# Patient Record
Sex: MALE | Race: WHITE
[De-identification: names, ages, dates, MRNs, and addresses within clinical notes are randomized per-mention and may not be internally consistent; named-entity substitution may affect disease eponyms.]

---

## 2018-03-01 ENCOUNTER — HOSPITAL ENCOUNTER (INPATIENT)
Dept: HOSPITAL 74 - YASAS | Age: 58
LOS: 5 days | Discharge: HOME | DRG: 897 | End: 2018-03-06
Attending: INTERNAL MEDICINE | Admitting: INTERNAL MEDICINE
Payer: COMMERCIAL

## 2018-03-01 VITALS — BODY MASS INDEX: 24.5 KG/M2

## 2018-03-01 DIAGNOSIS — R73.9: ICD-10-CM

## 2018-03-01 DIAGNOSIS — N17.9: ICD-10-CM

## 2018-03-01 DIAGNOSIS — F11.23: Primary | ICD-10-CM

## 2018-03-01 DIAGNOSIS — D72.829: ICD-10-CM

## 2018-03-01 DIAGNOSIS — K21.9: ICD-10-CM

## 2018-03-01 DIAGNOSIS — J02.9: ICD-10-CM

## 2018-03-01 DIAGNOSIS — F19.282: ICD-10-CM

## 2018-03-01 DIAGNOSIS — F17.213: ICD-10-CM

## 2018-03-01 PROCEDURE — HZ2ZZZZ DETOXIFICATION SERVICES FOR SUBSTANCE ABUSE TREATMENT: ICD-10-PCS | Performed by: INTERNAL MEDICINE

## 2018-03-01 RX ADMIN — ALUMINUM HYDROXIDE, MAGNESIUM HYDROXIDE, AND SIMETHICONE PRN ML: 200; 200; 20 SUSPENSION ORAL at 20:34

## 2018-03-01 RX ADMIN — Medication SCH: at 22:18

## 2018-03-01 RX ADMIN — QUETIAPINE FUMARATE SCH MG: 100 TABLET ORAL at 22:18

## 2018-03-01 RX ADMIN — Medication SCH MG: at 22:17
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
## 2018-03-01 NOTE — HP
COWS





- Scale


Resting Pulse: 0= NE 80 or Below


Sweatin=Flushed/Facial Moisture


Restless Observation: 1= Difficult to Sit Still


Pupil Size: 0= Normal to Room Light


Bone or Joint Aches: 2= Severe Diffuse Aches


Runny Nose/ Eye Tearin= Runny Nose/Eyes


GI Upset > 30mins: 2= Nausea/Diarrhea


Tremor Observation: 2= Slight Tremor Visible


Yawning Observation: 2= >3x During Session


Anxiety or Irritability: 2=Irritable/Anxious


Goose Flesh Skin: 3=Piloerection


COWS Score: 18





Admission ROS S





- HPI


Chief Complaint: 





I need to stop all types of pain medication. I want to get an alternative to 

deal with my pain.  


Allergies/Adverse Reactions: 


 Allergies











Allergy/AdvReac Type Severity Reaction Status Date / Time


 


No Known Allergies Allergy   Verified 18 12:24











History of Present Illness: 





Pt is a 58yr old male with a history of chronic pain and has been abusing 

fentayl and percocets for the last 2 months and he is seeking detox for his 

dependence. pt is willing to find an alternative to deal with his pain. pt 

states he will go back to his pain management doctor (Dr[facility] Sally Dowd [facility] [address] [place] [address] to discuss what he can do without 

needing any more opiods. Pt also will have this discussion with his PMD Dr. Miranda which required this patient to come to detox. Dr. Miranda also states that 

he is the doctor that has been prescribing this patient oxycodone. 


Exam Limitations: No Limitations





- Ebola screening


Have you traveled outside of the country in the last 21 days: No


Have you had contact with anyone from an Ebola affected area: No


Have you been sick,other than usual withdrawal symptoms: No


Do you have a fever: No





- Review of Systems


Constitutional: Chills, Diaphoresis, Loss of Appetite, Night Sweats, Changes in 

sleep, Unintentional Wgt. Loss


EENT: reports: No Symptoms Reported, Tearing, Nose Congestion


Respiratory: reports: No Symptoms reported


Cardiac: reports: Syncope


GI: reports: Diarrhea, Nausea, Poor Appetite, Poor Fluid Intake, Vomiting, 

Indigestion, Abdominal cramping


: reports: No Symptoms Reported


Musculoskeletal: reports: Back Pain, Joint Pain, Muscle Pain


Integumentary: reports: Flushing, Sweating


Neuro: reports: Tingling, Tremors


Endocrine: reports: Excessive Sweating, Flushing, Intolerance to Cold, 

Intolerance to Heat


Hematology: reports: No Symptoms Reported


Psychiatric: reports: Judgement Intact, Mood/Affect Appropiate, Orientated x3, 

Agitated, Anxious, Depressed


Other Systems: Reviewed and Negative





Patient History





- Patient Medical History


Hx Anemia: No


Hx Asthma: No


Hx Chronic Obstructive Pulmonary Disease (COPD): No


Hx Cancer: No


Hx Cardiac Disorders: No


Hx Congestive Heart Failure: No


Hx Hypertension: No


Hx Hypercholesterolemia: Yes (for only one month)


Hx Pacemaker: No


HX Cerebrovascular Accident: No


Hx Seizures: No


Hx Dementia: No


Hx Diabetes: No


Hx Gastrointestinal Disorders: No


Hx Liver Disease: No


Hx Genitourinary Disorders: No


Hx Sexually Transmitted Disorders: No


Hx Renal Disease (ESRD): No


Hx Thyroid Disease: No


Hx Human Immunodeficiency Virus (HIV): No (negative)


Hx Hepatitis C: No (negative)


Hx Depression: Yes


Hx Suicide Attempt: No (denies)


Hx Bipolar Disorder: No


Hx Schizophrenia: No





- Patient Surgical History


Past Surgical History: Yes


Hx Neurologic Surgery: Yes (fusion, back)


Hx Appendectomy: Yes





- PPD History


Previous Implant?: Yes


Documented Results: Negative w/o proof


Implanted On Prior [facility] Admission?: No


PPD to be Administered?: Yes





- Reproductive History


Patient is a Female of Child Bearing Age (11 -55 yrs old): No





- Smoking Cessation


Smoking history: Current every day smoker


Aproximately how many cigarettes per day: 6


Hx Chewing Tobacco Use: No


Initiated information on smoking cessation: Yes


'Breaking Loose' booklet given: 18





- Substance & Tx. History


Hx Alcohol Use: No


Hx Substance Use: Yes


Substance Use Type: Opiates


Hx Substance Use Treatment: No





- Substances Abused


  ** Percocet


Route: Oral


Frequency: 3-6 times per week


Amount used: 2 tabs. ( mg.)


Age of first use: 55


Date of Last Use: 18





Family Disease History





- Family Disease History


Family History: Denies





Admission Physical Exam [facility]





- Vital Signs


Vital Signs: 


 Vital Signs - 24 hr











  18





  10:37


 


Temperature 97.2 F L


 


Pulse Rate 88


 


Respiratory 18





Rate 


 


Blood Pressure 126/70














- Physical


General Appearance: Yes: Appropriately Dressed, Moderate Distress, Tremorous, 

Irritable, Sweating, Anxious


HEENTM: Yes: Normal Voice, Nasal Congestion, Rhinorrhea


Respiratory: Yes: Lungs Clear, Normal Breath Sounds, No Respiratory Distress


Neck: Yes: No masses,lesions,Nodules


Breast: Yes: Within Normal Limits


Cardiology: Yes: Regular Rhythm, Regular Rate, S1, S2


Abdominal: Yes: Normal Bowel Sounds


Genitourinary: Yes: Within Normal Limits


Back: Yes: Normal Inspection


Musculoskeletal: Yes: Back pain


Extremities: Yes: Normal Capillary Refill, Normal Inspection, Non-Tender, 

Tremors


Neurological: Yes: Fully Oriented, Alert, Normal Response


Integumentary: Yes: Normal Color


Lymphatic: Yes: Within Normal Limits





- Diagnostic


(1) Opioid dependence with withdrawal


Current Visit: Yes   Status: Chronic   





(2) Nicotine dependence


Current Visit: Yes   Status: Chronic   


Qualifiers: 


   Nicotine product type: cigarettes   Substance use status: uncomplicated   

Qualified Code(s): F17.210 - Nicotine dependence, cigarettes, uncomplicated   





(3) Fentanyl dependence


Current Visit: Yes   Status: Chronic   





Cleared for Admission [facility]





- Detox or Rehab


[facility] Level of Care: Medically Managed


Detox Regimen/Protocol: Methadone





[facility] Breath Alcohol Content


Breath Alcohol Content: 0





Urine Drug Screen





- Results


Drug Screen Negative: No


Urine Drug Screen Results: BZO-Benzodiazepines, TCA-Tricyclic Antidepress, OXY-

Oxycodone

## 2018-03-02 LAB
ALBUMIN SERPL-MCNC: 4.1 G/DL (ref 3.4–5)
ALP SERPL-CCNC: 106 U/L (ref 45–117)
ALT SERPL-CCNC: 121 U/L (ref 12–78)
ANION GAP SERPL CALC-SCNC: 9 MMOL/L (ref 8–16)
AST SERPL-CCNC: 375 U/L (ref 15–37)
BILIRUB SERPL-MCNC: 0.8 MG/DL (ref 0.2–1)
BUN SERPL-MCNC: 26 MG/DL (ref 7–18)
CALCIUM SERPL-MCNC: 9.3 MG/DL (ref 8.5–10.1)
CHLORIDE SERPL-SCNC: 95 MMOL/L (ref 98–107)
CO2 SERPL-SCNC: 28 MMOL/L (ref 21–32)
CREAT SERPL-MCNC: 1.5 MG/DL (ref 0.7–1.3)
DEPRECATED RDW RBC AUTO: 13.6 % (ref 11.9–15.9)
GLUCOSE SERPL-MCNC: 142 MG/DL (ref 74–106)
HCT VFR BLD CALC: 46.3 % (ref 35.4–49)
HGB BLD-MCNC: 15.5 GM/DL (ref 11.7–16.9)
MCH RBC QN AUTO: 28.4 PG (ref 25.7–33.7)
MCHC RBC AUTO-ENTMCNC: 33.5 G/DL (ref 32–35.9)
MCV RBC: 84.9 FL (ref 80–96)
PLATELET # BLD AUTO: 248 K/MM3 (ref 134–434)
PMV BLD: 10.1 FL (ref 7.5–11.1)
POTASSIUM SERPLBLD-SCNC: 4.1 MMOL/L (ref 3.5–5.1)
PROT SERPL-MCNC: 7.4 G/DL (ref 6.4–8.2)
RBC # BLD AUTO: 5.46 M/MM3 (ref 4–5.6)
SODIUM SERPL-SCNC: 132 MMOL/L (ref 136–145)
WBC # BLD AUTO: 15.7 K/MM3 (ref 4–10)

## 2018-03-02 RX ADMIN — Medication SCH TAB: at 10:39

## 2018-03-02 RX ADMIN — LIDOCAINE SCH PATCH: 50 PATCH TOPICAL at 10:39

## 2018-03-02 RX ADMIN — RANITIDINE SCH MG: 150 TABLET ORAL at 22:30

## 2018-03-02 RX ADMIN — NICOTINE SCH MG: 14 PATCH, EXTENDED RELEASE TRANSDERMAL at 10:39

## 2018-03-02 RX ADMIN — PREGABALIN SCH MG: 100 CAPSULE ORAL at 13:54

## 2018-03-02 RX ADMIN — ALUMINUM HYDROXIDE, MAGNESIUM HYDROXIDE, AND SIMETHICONE PRN ML: 200; 200; 20 SUSPENSION ORAL at 04:43

## 2018-03-02 RX ADMIN — CYCLOBENZAPRINE HYDROCHLORIDE PRN MG: 10 TABLET, FILM COATED ORAL at 04:43

## 2018-03-02 RX ADMIN — PREGABALIN SCH MG: 100 CAPSULE ORAL at 22:30

## 2018-03-02 RX ADMIN — Medication SCH EACH: at 22:31

## 2018-03-02 RX ADMIN — ALUMINUM HYDROXIDE, MAGNESIUM HYDROXIDE, AND SIMETHICONE PRN ML: 200; 200; 20 SUSPENSION ORAL at 11:09

## 2018-03-02 RX ADMIN — QUETIAPINE FUMARATE SCH MG: 100 TABLET ORAL at 22:30

## 2018-03-02 RX ADMIN — CYCLOBENZAPRINE HYDROCHLORIDE PRN MG: 10 TABLET, FILM COATED ORAL at 22:30

## 2018-03-02 RX ADMIN — Medication SCH MG: at 22:30

## 2018-03-02 RX ADMIN — ALUMINUM HYDROXIDE, MAGNESIUM HYDROXIDE, AND SIMETHICONE PRN ML: 200; 200; 20 SUSPENSION ORAL at 22:32

## 2018-03-02 NOTE — PN
[facility] COWS





- Scale


Resting Pulse: 2= -120


Sweatin= Chills/Flushing


Restless Observation: 3= Extraneous Movement


Pupil Size: 0= Normal to Room Light


Bone or Joint Aches: 4=Acute Joint/Muscle Pain


Runny Nose/ Eye Tearin= Nasal Congestion


GI Upset > 30mins: 1= Stomach Cramp


Tremor Observation of Outstretched Hands: 1= Tremor Felt, Not Seen


Yawning Observation: 1= 1-2x During Session


Anxiety or Irritability: 2=Irritable/Anxious


Goose Flesh Skin: 0=Smooth Skin


COWS Score: 16





[facility] Progress Note (SOAP)


Subjective: 





ANXIETY,IRRITABILITY,SWEATS,NAUSEA,VOMITING,DYSPEPSIA WITH BURNING THROAT 

SENSATION.


Objective: 





18 12:28


 








 


 Vital Signs











  18





  06:13 09:40


 


Temperature 98.3 F 98.1 F


 


Pulse Rate 96 H 102 H


 


Respiratory 18 20





Rate  


 


Blood Pressure 119/81 110/76








 Laboratory Last Values











WBC  15.7 K/mm3 (4.0-10.0)  H  18  05:40    


 


RBC  5.46 M/mm3 (4.00-5.60)   18  05:40    


 


Hgb  15.5 GM/dL (11.7-16.9)   18  05:40    


 


Hct  46.3 % (35.4-49)   18  05:40    


 


MCV  84.9 fl (80-96)   18  05:40    


 


MCH  28.4 pg (25.7-33.7)   18  05:40    


 


MCHC  33.5 g/dl (32.0-35.9)   18  05:40    


 


RDW  13.6 % (11.9-15.9)   18  05:40    


 


Plt Count  248 K/MM3 (134-434)   18  05:40    


 


MPV  10.1 fl (7.5-11.1)   18  05:40    


 


Sodium  132 mmol/L (136-145)  L  18  05:40    


 


Potassium  4.1 mmol/L (3.5-5.1)   18  05:40    


 


Chloride  95 mmol/L ()  L  18  05:40    


 


Carbon Dioxide  28 mmol/L (21-32)   18  05:40    


 


Anion Gap  9  (8-16)   18  05:40    


 


BUN  26 mg/dL (7-18)  H  18  05:40    


 


Creatinine  1.5 mg/dL (0.7-1.3)  H  18  05:40    


 


Creat Clearance w eGFR  48.07  (>60)   18  05:40    


 


Random Glucose  142 mg/dL ()  H  18  05:40    


 


Calcium  9.3 mg/dL (8.5-10.1)   18  05:40    


 


Total Bilirubin  0.8 mg/dL (0.2-1.0)   18  05:40    


 


AST  375 U/L (15-37)  H  18  05:40    


 


ALT  121 U/L (12-78)  H  18  05:40    


 


Alkaline Phosphatase  106 U/L ()   18  05:40    


 


Total Protein  7.4 g/dl (6.4-8.2)   18  05:40    


 


Albumin  4.1 g/dl (3.4-5.0)   18  05:40    








ELEVATED LIVER ENZYMES


UA PENDING











Assessment: 





18 12:30


WITHDRAWAL SX


Plan: 





CONTINUE DETOX


REPEAT LIVER ENZYMES ON 3/4/18

## 2018-03-02 NOTE — EKG
Test Reason : 

Blood Pressure : ***/*** mmHG

Vent. Rate : 060 BPM     Atrial Rate : 060 BPM

   P-R Int : 150 ms          QRS Dur : 082 ms

    QT Int : 412 ms       P-R-T Axes : 015 -05 032 degrees

   QTc Int : 412 ms

 

NORMAL SINUS RHYTHM

NORMAL ECG

NO PREVIOUS ECGS AVAILABLE

Confirmed by NICKY WOOD MD ([id number]) on 3/2/2018 10:31:17 AM

 

Referred By:             Confirmed By:NICKY WOOD MD

## 2018-03-03 RX ADMIN — Medication SCH TAB: at 10:26

## 2018-03-03 RX ADMIN — RANITIDINE SCH MG: 150 TABLET ORAL at 10:26

## 2018-03-03 RX ADMIN — NICOTINE SCH MG: 14 PATCH, EXTENDED RELEASE TRANSDERMAL at 10:26

## 2018-03-03 RX ADMIN — RANITIDINE SCH MG: 150 TABLET ORAL at 22:49

## 2018-03-03 RX ADMIN — PREGABALIN SCH MG: 100 CAPSULE ORAL at 22:49

## 2018-03-03 RX ADMIN — QUETIAPINE FUMARATE SCH MG: 100 TABLET ORAL at 22:49

## 2018-03-03 RX ADMIN — LIDOCAINE SCH PATCH: 50 PATCH TOPICAL at 10:26

## 2018-03-03 RX ADMIN — PHENOL PRN EACH: 14.5 LOZENGE ORAL at 13:14

## 2018-03-03 RX ADMIN — CYCLOBENZAPRINE HYDROCHLORIDE PRN MG: 10 TABLET, FILM COATED ORAL at 05:47

## 2018-03-03 RX ADMIN — PREGABALIN SCH MG: 100 CAPSULE ORAL at 13:15

## 2018-03-03 RX ADMIN — PREGABALIN SCH MG: 100 CAPSULE ORAL at 05:47

## 2018-03-03 RX ADMIN — Medication SCH MG: at 22:48

## 2018-03-03 RX ADMIN — Medication SCH EACH: at 22:48

## 2018-03-03 NOTE — PN
[facility] COWS





- Scale


Resting Pulse: 0= DE 80 or Below


Sweatin= Chills/Flushing


Restless Observation: 1= Difficult to Sit Still


Pupil Size: 0= Normal to Room Light


Bone or Joint Aches: 4=Acute Joint/Muscle Pain


Runny Nose/ Eye Tearin= None


GI Upset > 30mins: 2= Nausea/Diarrhea


Tremor Observation of Outstretched Hands: 0= None


Yawning Observation: 1= 1-2x During Session


Anxiety or Irritability: 2=Irritable/Anxious


Goose Flesh Skin: 3=Piloerection


COWS Score: 14





[facility] Progress Note (SOAP)


Subjective: 





Anxious, Body Aches, Vomiting.


Objective: 


PATIENT A & O X 2 (UNCERTAIN ABOUT CURRENT DAY / DATE). NO ACUTE DISTRESS.





18 16:29


 Vital Signs











Temperature  96.7 F L  18 09:52


 


Pulse Rate  62   18 09:52


 


Respiratory Rate  18   18 09:52


 


Blood Pressure  149/90   18 09:52


 


O2 Sat by Pulse Oximetry (%)      








 Laboratory Tests











  18





  05:40 05:40 05:40


 


WBC  15.7 H  


 


RBC  5.46  


 


Hgb  15.5  


 


Hct  46.3  


 


MCV  84.9  


 


MCH  28.4  


 


MCHC  33.5  


 


RDW  13.6  


 


Plt Count  248  


 


MPV  10.1  


 


Sodium   132 L 


 


Potassium   4.1 


 


Chloride   95 L 


 


Carbon Dioxide   28 


 


Anion Gap   9 


 


BUN   26 H 


 


Creatinine   1.5 H 


 


Creat Clearance w eGFR   48.07 


 


Random Glucose   142 H 


 


Calcium   9.3 


 


Total Bilirubin   0.8 


 


AST   375 H 


 


ALT   121 H 


 


Alkaline Phosphatase   106 


 


Total Protein   7.4 


 


Albumin   4.1 


 


RPR Titer    Nonreactive








LABS NOTED.


UA RESULTS PENDING.


18 16:33





Assessment: 





18 16:30


WITHDRAWAL SYMPTOMS.


INCREASE DAILY PO FLUID INTAKE.


Plan: 





CONTINUE DETOX.


REPEAT AST, ALT TOMORROW AM FOR ELEVATED ADMISSION LEVELS.


INCREASE DAILY PO FLUID INTAKE.


BGM ACBK FOR ELEVATED ADMISSION RANDOM GLUCOSE LEVEL.

## 2018-03-04 LAB
ALT SERPL-CCNC: 90 U/L (ref 12–78)
APPEARANCE UR: CLEAR
AST SERPL-CCNC: 62 U/L (ref 15–37)
BILIRUB UR STRIP.AUTO-MCNC: NEGATIVE MG/DL
COLOR UR: YELLOW
KETONES UR QL STRIP: NEGATIVE
LEUKOCYTE ESTERASE UR QL STRIP.AUTO: NEGATIVE
NITRITE UR QL STRIP: NEGATIVE
PH UR: 7 [PH] (ref 5–8)
PROT UR QL STRIP: NEGATIVE
PROT UR QL STRIP: NEGATIVE
RBC # UR STRIP: NEGATIVE /UL
SP GR UR: 1.01 (ref 1–1.03)
UROBILINOGEN UR STRIP-MCNC: NEGATIVE MG/DL (ref 0.2–1)

## 2018-03-04 RX ADMIN — RANITIDINE SCH MG: 150 TABLET ORAL at 10:24

## 2018-03-04 RX ADMIN — PREGABALIN SCH MG: 100 CAPSULE ORAL at 22:19

## 2018-03-04 RX ADMIN — Medication SCH MG: at 22:19

## 2018-03-04 RX ADMIN — QUETIAPINE FUMARATE SCH MG: 100 TABLET ORAL at 22:19

## 2018-03-04 RX ADMIN — PHENOL PRN EACH: 14.5 LOZENGE ORAL at 11:47

## 2018-03-04 RX ADMIN — PREGABALIN SCH MG: 100 CAPSULE ORAL at 05:23

## 2018-03-04 RX ADMIN — ALUMINUM HYDROXIDE, MAGNESIUM HYDROXIDE, AND SIMETHICONE PRN ML: 200; 200; 20 SUSPENSION ORAL at 19:35

## 2018-03-04 RX ADMIN — LIDOCAINE SCH PATCH: 50 PATCH TOPICAL at 10:24

## 2018-03-04 RX ADMIN — Medication SCH TAB: at 10:24

## 2018-03-04 RX ADMIN — RANITIDINE SCH MG: 150 TABLET ORAL at 22:19

## 2018-03-04 RX ADMIN — CYCLOBENZAPRINE HYDROCHLORIDE PRN MG: 10 TABLET, FILM COATED ORAL at 05:23

## 2018-03-04 RX ADMIN — NICOTINE SCH MG: 14 PATCH, EXTENDED RELEASE TRANSDERMAL at 10:24

## 2018-03-04 RX ADMIN — PREGABALIN SCH MG: 100 CAPSULE ORAL at 14:18

## 2018-03-04 RX ADMIN — Medication SCH EACH: at 22:19

## 2018-03-04 NOTE — PN
[facility] Progress Note (SOAP)


Subjective: 





Indigestion, throat hurts when swallows since admission, diarrhea


Objective: 





03/04/18 16:40


 Last Vital Signs











Temp Pulse Resp BP Pulse Ox


 


 96.1 F L  82   20   123/74    


 


 03/04/18 14:31  03/04/18 14:31  03/04/18 14:31  03/04/18 14:31   











Mouth: moist mucous membrane, moderate pharyngeal erythema without exudates





 Laboratory Tests











  03/02/18 03/02/18 03/02/18





  05:40 05:40 05:40


 


WBC  15.7 H  


 


RBC  5.46  


 


Hgb  15.5  


 


Hct  46.3  


 


MCV  84.9  


 


MCH  28.4  


 


MCHC  33.5  


 


RDW  13.6  


 


Plt Count  248  


 


MPV  10.1  


 


Sodium   132 L 


 


Potassium   4.1 


 


Chloride   95 L 


 


Carbon Dioxide   28 


 


Anion Gap   9 


 


BUN   26 H 


 


Creatinine   1.5 H 


 


Creat Clearance w eGFR   48.07 


 


POC Glucometer   


 


Random Glucose   142 H 


 


Calcium   9.3 


 


Total Bilirubin   0.8 


 


AST   375 H 


 


ALT   121 H 


 


Alkaline Phosphatase   106 


 


Total Protein   7.4 


 


Albumin   4.1 


 


Urine Color   


 


Urine Appearance   


 


Urine pH   


 


Ur Specific Gravity   


 


Urine Protein   


 


Urine Glucose (UA)   


 


Urine Ketones   


 


Urine Blood   


 


Urine Nitrite   


 


Urine Bilirubin   


 


Urine Urobilinogen   


 


Ur Leukocyte Esterase   


 


RPR Titer    Nonreactive














  03/04/18 03/04/18 03/04/18





  06:37 08:00 10:33


 


WBC   


 


RBC   


 


Hgb   


 


Hct   


 


MCV   


 


MCH   


 


MCHC   


 


RDW   


 


Plt Count   


 


MPV   


 


Sodium   


 


Potassium   


 


Chloride   


 


Carbon Dioxide   


 


Anion Gap   


 


BUN   


 


Creatinine   


 


Creat Clearance w eGFR   


 


POC Glucometer  97  


 


Random Glucose   


 


Calcium   


 


Total Bilirubin   


 


AST   62 H D 


 


ALT   90 H D 


 


Alkaline Phosphatase   


 


Total Protein   


 


Albumin   


 


Urine Color    Yellow


 


Urine Appearance    Clear


 


Urine pH    7.0


 


Ur Specific Gravity    1.015


 


Urine Protein    Negative


 


Urine Glucose (UA)    Negative


 


Urine Ketones    Negative


 


Urine Blood    Negative


 


Urine Nitrite    Negative


 


Urine Bilirubin    Negative


 


Urine Urobilinogen    Negative


 


Ur Leukocyte Esterase    Negative


 


RPR Titer   








Labs noted: wbc 15.7, serum creatinine 1.5, bun 26, serum glucose 142


Assessment: 





03/04/18 16:43


Withdrawal symptoms





Noted with acute pharyngitis, leukocytosis, DEEPTI and hyperglycemia


Plan: 





Continue detox





Acute pharyngitis: encouraged to drink more water, Z KARLI





Leukocytosis: most likely r/t pharyngitis, repeat cbc





DEEPTI: encouraged to drink lots of water, repeat bmp





Hyperglycemia: repeat fasting glucose, send A1c

## 2018-03-05 LAB
ANION GAP SERPL CALC-SCNC: 10 MMOL/L (ref 8–16)
BASOPHILS # BLD: 1.9 % (ref 0–2)
BUN SERPL-MCNC: 10 MG/DL (ref 7–18)
CALCIUM SERPL-MCNC: 8.7 MG/DL (ref 8.5–10.1)
CHLORIDE SERPL-SCNC: 101 MMOL/L (ref 98–107)
CO2 SERPL-SCNC: 30 MMOL/L (ref 21–32)
CREAT SERPL-MCNC: 0.7 MG/DL (ref 0.7–1.3)
DEPRECATED RDW RBC AUTO: 13.2 % (ref 11.9–15.9)
EOSINOPHIL # BLD: 4.3 % (ref 0–4.5)
GLUCOSE SERPL-MCNC: 90 MG/DL (ref 74–106)
HCT VFR BLD CALC: 38.7 % (ref 35.4–49)
HGB BLD-MCNC: 13 GM/DL (ref 11.7–16.9)
LYMPHOCYTES # BLD: 40.6 % (ref 8–40)
MCH RBC QN AUTO: 28.8 PG (ref 25.7–33.7)
MCHC RBC AUTO-ENTMCNC: 33.5 G/DL (ref 32–35.9)
MCV RBC: 86.1 FL (ref 80–96)
MONOCYTES # BLD AUTO: 11 % (ref 3.8–10.2)
NEUTROPHILS # BLD: 42.2 % (ref 42.8–82.8)
PLATELET # BLD AUTO: 201 K/MM3 (ref 134–434)
PMV BLD: 9.9 FL (ref 7.5–11.1)
POTASSIUM SERPLBLD-SCNC: 4.5 MMOL/L (ref 3.5–5.1)
RBC # BLD AUTO: 4.49 M/MM3 (ref 4–5.6)
SODIUM SERPL-SCNC: 141 MMOL/L (ref 136–145)
WBC # BLD AUTO: 6.7 K/MM3 (ref 4–10)

## 2018-03-05 RX ADMIN — QUETIAPINE FUMARATE SCH MG: 100 TABLET ORAL at 22:20

## 2018-03-05 RX ADMIN — NICOTINE SCH MG: 14 PATCH, EXTENDED RELEASE TRANSDERMAL at 10:26

## 2018-03-05 RX ADMIN — PREGABALIN SCH MG: 100 CAPSULE ORAL at 05:51

## 2018-03-05 RX ADMIN — CYCLOBENZAPRINE HYDROCHLORIDE PRN MG: 10 TABLET, FILM COATED ORAL at 17:39

## 2018-03-05 RX ADMIN — LIDOCAINE SCH PATCH: 50 PATCH TOPICAL at 10:26

## 2018-03-05 RX ADMIN — Medication SCH TAB: at 10:25

## 2018-03-05 RX ADMIN — RANITIDINE SCH MG: 150 TABLET ORAL at 10:26

## 2018-03-05 RX ADMIN — Medication SCH: at 22:20

## 2018-03-05 RX ADMIN — RANITIDINE SCH MG: 150 TABLET ORAL at 22:20

## 2018-03-05 RX ADMIN — Medication SCH MG: at 22:20

## 2018-03-05 RX ADMIN — HYDROXYZINE PAMOATE PRN MG: 50 CAPSULE ORAL at 17:39

## 2018-03-05 RX ADMIN — PREGABALIN SCH MG: 100 CAPSULE ORAL at 13:37

## 2018-03-05 RX ADMIN — PREGABALIN SCH MG: 100 CAPSULE ORAL at 22:20

## 2018-03-05 RX ADMIN — HYDROXYZINE PAMOATE PRN MG: 50 CAPSULE ORAL at 10:26

## 2018-03-05 RX ADMIN — AZITHROMYCIN SCH MG: 250 TABLET, FILM COATED ORAL at 12:01

## 2018-03-05 NOTE — PN
[facility] Progress Note (SOAP)


Subjective: 





sweats


shakes


diarrhea


Burning to throat and upper belly


Objective: 





03/05/18 11:58


A & O x 3


 Vital Signs











Temperature  96.8 F L  03/05/18 09:27


 


Pulse Rate  80   03/05/18 09:27


 


Respiratory Rate  18   03/05/18 09:27


 


Blood Pressure  128/80   03/05/18 09:27


 


O2 Sat by Pulse Oximetry (%)      











Assessment: 





03/05/18 11:58


withdrawal sx


GERD


Plan: 





continue detox


Protonix for GERD


for d/c tomorrow

## 2018-03-06 VITALS — DIASTOLIC BLOOD PRESSURE: 73 MMHG | TEMPERATURE: 96.1 F | HEART RATE: 73 BPM | SYSTOLIC BLOOD PRESSURE: 118 MMHG

## 2018-03-06 RX ADMIN — RANITIDINE SCH MG: 150 TABLET ORAL at 09:15

## 2018-03-06 RX ADMIN — Medication SCH TAB: at 09:15

## 2018-03-06 RX ADMIN — AZITHROMYCIN SCH MG: 250 TABLET, FILM COATED ORAL at 09:15

## 2018-03-06 RX ADMIN — PREGABALIN SCH MG: 100 CAPSULE ORAL at 06:08

## 2018-03-06 NOTE — DS
[facility] Detox Discharge Summary


Admission Date: 


03/01/18





Discharge Date: 03/06/18





- History


Present History: Opioid Dependence


Additional Comments: 





PATIENT ELECTING TO GO HOME AT THIS TIME. PATIENT ADVISED TO CONSIDER LOCAL 12-

STEP / NA OUTPATIENT SUPPORT GROUPS FOR AFTERCARE. PATIENT WAS DISCHARGED FROM 

DETOX UNIT IN STABLE MEDICAL CONDITION.


Pertinent Past History: 





Nicotine Dependence, Depression.





- Physical Exam Results


Vital Signs: 


 Vital Signs











Temperature  96.1 F L  03/06/18 09:12


 


Pulse Rate  73   03/06/18 09:12


 


Respiratory Rate  18   03/06/18 09:12


 


Blood Pressure  118/73   03/06/18 09:12


 


O2 Sat by Pulse Oximetry (%)      











Pertinent Admission Physical Exam Findings: 





WITHDRAWAL SYMPTOMS.





 Laboratory Tests











  03/02/18 03/02/18 03/02/18





  05:40 05:40 05:40


 


WBC  15.7 H  


 


RBC  5.46  


 


Hgb  15.5  


 


Hct  46.3  


 


MCV  84.9  


 


MCH  28.4  


 


MCHC  33.5  


 


RDW  13.6  


 


Plt Count  248  


 


MPV  10.1  


 


Neutrophils %   


 


Lymphocytes %   


 


Monocytes %   


 


Eosinophils %   


 


Basophils %   


 


Sodium   132 L 


 


Potassium   4.1 


 


Chloride   95 L 


 


Carbon Dioxide   28 


 


Anion Gap   9 


 


BUN   26 H 


 


Creatinine   1.5 H 


 


Creat Clearance w eGFR   48.07 


 


POC Glucometer   


 


Random Glucose   142 H 


 


Hemoglobin A1c %   


 


Calcium   9.3 


 


Total Bilirubin   0.8 


 


AST   375 H 


 


ALT   121 H 


 


Alkaline Phosphatase   106 


 


Total Protein   7.4 


 


Albumin   4.1 


 


Urine Color   


 


Urine Appearance   


 


Urine pH   


 


Ur Specific Gravity   


 


Urine Protein   


 


Urine Glucose (UA)   


 


Urine Ketones   


 


Urine Blood   


 


Urine Nitrite   


 


Urine Bilirubin   


 


Urine Urobilinogen   


 


Ur Leukocyte Esterase   


 


RPR Titer    Nonreactive














  03/04/18 03/04/18 03/04/18





  06:37 08:00 10:33


 


WBC   


 


RBC   


 


Hgb   


 


Hct   


 


MCV   


 


MCH   


 


MCHC   


 


RDW   


 


Plt Count   


 


MPV   


 


Neutrophils %   


 


Lymphocytes %   


 


Monocytes %   


 


Eosinophils %   


 


Basophils %   


 


Sodium   


 


Potassium   


 


Chloride   


 


Carbon Dioxide   


 


Anion Gap   


 


BUN   


 


Creatinine   


 


Creat Clearance w eGFR   


 


POC Glucometer  97  


 


Random Glucose   


 


Hemoglobin A1c %   


 


Calcium   


 


Total Bilirubin   


 


AST   62 H D 


 


ALT   90 H D 


 


Alkaline Phosphatase   


 


Total Protein   


 


Albumin   


 


Urine Color    Yellow


 


Urine Appearance    Clear


 


Urine pH    7.0


 


Ur Specific Gravity    1.015


 


Urine Protein    Negative


 


Urine Glucose (UA)    Negative


 


Urine Ketones    Negative


 


Urine Blood    Negative


 


Urine Nitrite    Negative


 


Urine Bilirubin    Negative


 


Urine Urobilinogen    Negative


 


Ur Leukocyte Esterase    Negative


 


RPR Titer   














  03/05/18 03/05/18 03/05/18





  05:52 07:00 07:00


 


WBC   6.7  D 


 


RBC   4.49 


 


Hgb   13.0  D 


 


Hct   38.7  D 


 


MCV   86.1 


 


MCH   28.8 


 


MCHC   33.5 


 


RDW   13.2 


 


Plt Count   201 


 


MPV   9.9 


 


Neutrophils %   42.2 L 


 


Lymphocytes %   40.6 H 


 


Monocytes %   11.0 H 


 


Eosinophils %   4.3 


 


Basophils %   1.9 


 


Sodium    141


 


Potassium    4.5


 


Chloride    101


 


Carbon Dioxide    30


 


Anion Gap    10


 


BUN    10  D


 


Creatinine    0.7  D


 


Creat Clearance w eGFR   


 


POC Glucometer  98  


 


Random Glucose    90  D


 


Hemoglobin A1c %   


 


Calcium    8.7


 


Total Bilirubin   


 


AST   


 


ALT   


 


Alkaline Phosphatase   


 


Total Protein   


 


Albumin   


 


Urine Color   


 


Urine Appearance   


 


Urine pH   


 


Ur Specific Gravity   


 


Urine Protein   


 


Urine Glucose (UA)   


 


Urine Ketones   


 


Urine Blood   


 


Urine Nitrite   


 


Urine Bilirubin   


 


Urine Urobilinogen   


 


Ur Leukocyte Esterase   


 


RPR Titer   














  03/05/18 03/06/18





  07:00 06:08


 


WBC  


 


RBC  


 


Hgb  


 


Hct  


 


MCV  


 


MCH  


 


MCHC  


 


RDW  


 


Plt Count  


 


MPV  


 


Neutrophils %  


 


Lymphocytes %  


 


Monocytes %  


 


Eosinophils %  


 


Basophils %  


 


Sodium  


 


Potassium  


 


Chloride  


 


Carbon Dioxide  


 


Anion Gap  


 


BUN  


 


Creatinine  


 


Creat Clearance w eGFR  


 


POC Glucometer   107


 


Random Glucose  


 


Hemoglobin A1c %  5.6 


 


Calcium  


 


Total Bilirubin  


 


AST  


 


ALT  


 


Alkaline Phosphatase  


 


Total Protein  


 


Albumin  


 


Urine Color  


 


Urine Appearance  


 


Urine pH  


 


Ur Specific Gravity  


 


Urine Protein  


 


Urine Glucose (UA)  


 


Urine Ketones  


 


Urine Blood  


 


Urine Nitrite  


 


Urine Bilirubin  


 


Urine Urobilinogen  


 


Ur Leukocyte Esterase  


 


RPR Titer  








LABS NOTED.





- Treatment


Hospital Course: Detox Protocol Followed, Detoxed Safely, Responded well, 

Discharged Condition Good


Patient has Accepted a Rehab Referral to: [facility],ADVISED TO CONSIDER 

LOCAL 12-STEP/NA OUTPATIENT SUPPORT GROUP





- Medication


Discharge Medications: 


Ambulatory Orders





Quetiapine Fumarate [Seroquel -] 100 mg PO HS 03/01/18 











- Diagnosis


(1) Nicotine dependence


Status: Chronic   


Qualifiers: 


   Nicotine product type: cigarettes   Substance use status: in withdrawal   

Qualified Code(s): F17.213 - Nicotine dependence, cigarettes, with withdrawal   





(2) Opioid dependence with withdrawal


Status: Acute   





(3) Fentanyl dependence


Status: Acute   





(4) Substance-induced sleep disorder


Status: Acute   





- AMA


Did Patient Leave Against Medical Advice: No

## 2021-09-02 ENCOUNTER — HOSPITAL ENCOUNTER (EMERGENCY)
Dept: HOSPITAL 74 - JERFT | Age: 61
Discharge: HOME | End: 2021-09-02
Payer: COMMERCIAL

## 2021-09-02 VITALS — DIASTOLIC BLOOD PRESSURE: 77 MMHG | SYSTOLIC BLOOD PRESSURE: 121 MMHG | TEMPERATURE: 98.6 F | HEART RATE: 91 BPM

## 2021-09-02 VITALS — BODY MASS INDEX: 29.6 KG/M2

## 2021-09-02 DIAGNOSIS — M25.512: Primary | ICD-10-CM

## 2021-09-02 PROCEDURE — 3E023GC INTRODUCTION OF OTHER THERAPEUTIC SUBSTANCE INTO MUSCLE, PERCUTANEOUS APPROACH: ICD-10-PCS

## 2021-10-08 ENCOUNTER — HOSPITAL ENCOUNTER (OUTPATIENT)
Dept: HOSPITAL 74 - FASU | Age: 61
Discharge: HOME | End: 2021-10-08
Attending: ORTHOPAEDIC SURGERY
Payer: COMMERCIAL

## 2021-10-08 VITALS — SYSTOLIC BLOOD PRESSURE: 122 MMHG | HEART RATE: 76 BPM | DIASTOLIC BLOOD PRESSURE: 71 MMHG

## 2021-10-08 VITALS — BODY MASS INDEX: 27.6 KG/M2

## 2021-10-08 VITALS — TEMPERATURE: 97.3 F

## 2021-10-08 DIAGNOSIS — M75.02: ICD-10-CM

## 2021-10-08 DIAGNOSIS — M94.212: ICD-10-CM
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
## 2021-10-08 DIAGNOSIS — M75.122: Primary | ICD-10-CM
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
## 2021-10-08 DIAGNOSIS — M75.52: ICD-10-CM

## 2021-10-08 DIAGNOSIS — S43.432A: ICD-10-CM
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
## 2021-10-08 DIAGNOSIS — M65.812: ICD-10-CM

## 2021-10-08 DIAGNOSIS — M67.814: ICD-10-CM

## 2021-10-08 PROCEDURE — 0RBK4ZZ EXCISION OF LEFT SHOULDER JOINT, PERCUTANEOUS ENDOSCOPIC APPROACH: ICD-10-PCS | Performed by: ORTHOPAEDIC SURGERY

## 2021-10-08 PROCEDURE — 0RNK4ZZ RELEASE LEFT SHOULDER JOINT, PERCUTANEOUS ENDOSCOPIC APPROACH: ICD-10-PCS | Performed by: ORTHOPAEDIC SURGERY

## 2021-10-08 PROCEDURE — 0LM24ZZ REATTACHMENT OF LEFT SHOULDER TENDON, PERCUTANEOUS ENDOSCOPIC APPROACH: ICD-10-PCS | Performed by: ORTHOPAEDIC SURGERY

## 2021-10-08 PROCEDURE — 0RHK48Z INSERTION OF SPACER INTO LEFT SHOULDER JOINT, PERCUTANEOUS ENDOSCOPIC APPROACH: ICD-10-PCS | Performed by: ORTHOPAEDIC SURGERY

## 2023-01-31 ENCOUNTER — HOSPITAL ENCOUNTER (INPATIENT)
Dept: HOSPITAL 74 - J2C | Age: 63
LOS: 14 days | Discharge: HOME | DRG: 304 | End: 2023-02-14
Attending: NURSE PRACTITIONER | Admitting: INTERNAL MEDICINE
Payer: COMMERCIAL

## 2023-01-31 VITALS — BODY MASS INDEX: 26.6 KG/M2

## 2023-01-31 DIAGNOSIS — M51.36: Primary | ICD-10-CM

## 2023-01-31 DIAGNOSIS — F19.10: ICD-10-CM

## 2023-01-31 DIAGNOSIS — F11.20: ICD-10-CM

## 2023-01-31 DIAGNOSIS — F17.210: ICD-10-CM

## 2023-01-31 DIAGNOSIS — M47.816: ICD-10-CM

## 2023-01-31 PROCEDURE — C1889 IMPLANT/INSERT DEVICE, NOC: HCPCS

## 2023-01-31 PROCEDURE — C1713 ANCHOR/SCREW BN/BN,TIS/BN: HCPCS

## 2023-02-09 PROCEDURE — 4A11X4G MONITORING OF PERIPHERAL NERVOUS ELECTRICAL ACTIVITY, INTRAOPERATIVE, EXTERNAL APPROACH: ICD-10-PCS | Performed by: NEUROLOGICAL SURGERY

## 2023-02-09 PROCEDURE — 0SB20ZZ EXCISION OF LUMBAR VERTEBRAL DISC, OPEN APPROACH: ICD-10-PCS | Performed by: NEUROLOGICAL SURGERY

## 2023-02-09 PROCEDURE — 0SP304Z REMOVAL OF INTERNAL FIXATION DEVICE FROM LUMBOSACRAL JOINT, OPEN APPROACH: ICD-10-PCS | Performed by: NEUROLOGICAL SURGERY

## 2023-02-09 PROCEDURE — 0SP004Z REMOVAL OF INTERNAL FIXATION DEVICE FROM LUMBAR VERTEBRAL JOINT, OPEN APPROACH: ICD-10-PCS | Performed by: NEUROLOGICAL SURGERY

## 2023-02-09 PROCEDURE — 0SG3071 FUSION OF LUMBOSACRAL JOINT WITH AUTOLOGOUS TISSUE SUBSTITUTE, POSTERIOR APPROACH, POSTERIOR COLUMN, OPEN APPROACH: ICD-10-PCS | Performed by: NEUROLOGICAL SURGERY

## 2023-02-09 PROCEDURE — 0SG1071 FUSION OF 2 OR MORE LUMBAR VERTEBRAL JOINTS WITH AUTOLOGOUS TISSUE SUBSTITUTE, POSTERIOR APPROACH, POSTERIOR COLUMN, OPEN APPROACH: ICD-10-PCS | Performed by: NEUROLOGICAL SURGERY

## 2023-02-09 PROCEDURE — 0SB40ZZ EXCISION OF LUMBOSACRAL DISC, OPEN APPROACH: ICD-10-PCS | Performed by: NEUROLOGICAL SURGERY

## 2023-02-09 PROCEDURE — 0SG10AJ FUSION OF 2 OR MORE LUMBAR VERTEBRAL JOINTS WITH INTERBODY FUSION DEVICE, POSTERIOR APPROACH, ANTERIOR COLUMN, OPEN APPROACH: ICD-10-PCS | Performed by: NEUROLOGICAL SURGERY

## 2023-02-09 RX ADMIN — Medication PRN MG: at 23:15

## 2023-02-09 RX ADMIN — BENZOCAINE AND MENTHOL PRN EACH: 15; 3.6 LOZENGE ORAL at 23:44

## 2023-02-09 RX ADMIN — Medication SCH: at 22:51

## 2023-02-09 RX ADMIN — CEFAZOLIN SCH MLS/HR: 1 INJECTION, POWDER, FOR SOLUTION INTRAVENOUS at 21:19

## 2023-02-09 RX ADMIN — DOCUSATE SODIUM SCH MG: 100 CAPSULE, LIQUID FILLED ORAL at 21:19

## 2023-02-10 LAB
ANION GAP SERPL CALC-SCNC: 7 MMOL/L (ref 8–16)
BUN SERPL-MCNC: 13.3 MG/DL (ref 7–18)
CALCIUM SERPL-MCNC: 8.2 MG/DL (ref 8.5–10.1)
CHLORIDE SERPL-SCNC: 104 MMOL/L (ref 98–107)
CO2 SERPL-SCNC: 26 MMOL/L (ref 21–32)
CREAT SERPL-MCNC: 0.8 MG/DL (ref 0.55–1.3)
DEPRECATED RDW RBC AUTO: 14.1 % (ref 11.9–15.9)
GLUCOSE SERPL-MCNC: 103 MG/DL (ref 74–106)
HCT VFR BLD CALC: 32.9 % (ref 35.4–49)
HGB BLD-MCNC: 10.8 GM/DL (ref 11.7–16.9)
MAGNESIUM SERPL-MCNC: 2 MG/DL (ref 1.8–2.4)
MCH RBC QN AUTO: 28.9 PG (ref 25.7–33.7)
MCHC RBC AUTO-ENTMCNC: 32.9 G/DL (ref 32–35.9)
MCV RBC: 87.9 FL (ref 80–96)
PLATELET # BLD AUTO: 108 10^3/UL (ref 134–434)
PMV BLD: 11.1 FL (ref 7.5–11.1)
RBC # BLD AUTO: 3.74 M/MM3 (ref 4–5.6)
SODIUM SERPL-SCNC: 137 MMOL/L (ref 136–145)
WBC # BLD AUTO: 11.9 K/MM3 (ref 4–10)

## 2023-02-10 RX ADMIN — ACETAMINOPHEN SCH MG: 10 INJECTION, SOLUTION INTRAVENOUS at 11:12

## 2023-02-10 RX ADMIN — DOCUSATE SODIUM SCH MG: 100 CAPSULE, LIQUID FILLED ORAL at 05:43

## 2023-02-10 RX ADMIN — PREGABALIN SCH MG: 50 CAPSULE ORAL at 21:23

## 2023-02-10 RX ADMIN — CEFAZOLIN SCH MLS/HR: 1 INJECTION, POWDER, FOR SOLUTION INTRAVENOUS at 06:02

## 2023-02-10 RX ADMIN — LIDOCAINE SCH: 50 PATCH TOPICAL at 09:31

## 2023-02-10 RX ADMIN — HEPARIN SODIUM SCH UNIT: 5000 INJECTION, SOLUTION INTRAVENOUS; SUBCUTANEOUS at 09:32

## 2023-02-10 RX ADMIN — HEPARIN SODIUM SCH UNIT: 5000 INJECTION, SOLUTION INTRAVENOUS; SUBCUTANEOUS at 17:31

## 2023-02-10 RX ADMIN — FOLIC ACID SCH MG: 1 TABLET ORAL at 09:32

## 2023-02-10 RX ADMIN — DOCUSATE SODIUM SCH MG: 100 CAPSULE, LIQUID FILLED ORAL at 21:23

## 2023-02-10 RX ADMIN — Medication PRN MG: at 23:56

## 2023-02-10 RX ADMIN — PREGABALIN SCH MG: 50 CAPSULE ORAL at 13:14

## 2023-02-10 RX ADMIN — NICOTINE SCH: 7 PATCH TRANSDERMAL at 09:32

## 2023-02-10 RX ADMIN — CEFAZOLIN SCH MLS/HR: 1 INJECTION, POWDER, FOR SOLUTION INTRAVENOUS at 13:13

## 2023-02-10 RX ADMIN — BENZOCAINE AND MENTHOL PRN EACH: 15; 3.6 LOZENGE ORAL at 23:56

## 2023-02-10 RX ADMIN — POLYETHYLENE GLYCOL 3350 SCH GM: 17 POWDER, FOR SOLUTION ORAL at 21:23

## 2023-02-10 RX ADMIN — ACETAMINOPHEN SCH MG: 500 TABLET, FILM COATED ORAL at 23:55

## 2023-02-10 RX ADMIN — ACETAMINOPHEN SCH MG: 500 TABLET, FILM COATED ORAL at 17:30

## 2023-02-10 RX ADMIN — Medication SCH: at 21:33

## 2023-02-10 RX ADMIN — CEFAZOLIN SCH MLS/HR: 1 INJECTION, POWDER, FOR SOLUTION INTRAVENOUS at 21:22

## 2023-02-10 RX ADMIN — FERROUS SULFATE TAB EC 324 MG (65 MG FE EQUIVALENT) SCH MG: 324 (65 FE) TABLET DELAYED RESPONSE at 09:32

## 2023-02-10 RX ADMIN — ACETAMINOPHEN SCH MG: 10 INJECTION, SOLUTION INTRAVENOUS at 05:42

## 2023-02-10 RX ADMIN — PREGABALIN SCH MG: 50 CAPSULE ORAL at 09:32

## 2023-02-10 RX ADMIN — DOCUSATE SODIUM SCH MG: 100 CAPSULE, LIQUID FILLED ORAL at 13:13

## 2023-02-11 LAB
ALBUMIN SERPL-MCNC: 3.1 G/DL (ref 3.4–5)
ALP SERPL-CCNC: 94 U/L (ref 45–117)
ALT SERPL-CCNC: 27 U/L (ref 13–61)
ANION GAP SERPL CALC-SCNC: 2 MMOL/L (ref 8–16)
AST SERPL-CCNC: 62 U/L (ref 15–37)
BASOPHILS # BLD: 0.8 % (ref 0–2)
BILIRUB SERPL-MCNC: 0.4 MG/DL (ref 0.2–1)
BUN SERPL-MCNC: 8 MG/DL (ref 7–18)
CALCIUM SERPL-MCNC: 8.6 MG/DL (ref 8.5–10.1)
CHLORIDE SERPL-SCNC: 102 MMOL/L (ref 98–107)
CO2 SERPL-SCNC: 33 MMOL/L (ref 21–32)
CREAT SERPL-MCNC: 0.8 MG/DL (ref 0.55–1.3)
DEPRECATED RDW RBC AUTO: 14.5 % (ref 11.9–15.9)
EOSINOPHIL # BLD: 1.2 % (ref 0–4.5)
GLUCOSE SERPL-MCNC: 101 MG/DL (ref 74–106)
HCT VFR BLD CALC: 35 % (ref 35.4–49)
HGB BLD-MCNC: 11.5 GM/DL (ref 11.7–16.9)
LYMPHOCYTES # BLD: 17.2 % (ref 8–40)
MAGNESIUM SERPL-MCNC: 1.9 MG/DL (ref 1.8–2.4)
MCH RBC QN AUTO: 29.1 PG (ref 25.7–33.7)
MCHC RBC AUTO-ENTMCNC: 32.9 G/DL (ref 32–35.9)
MCV RBC: 88.4 FL (ref 80–96)
MONOCYTES # BLD AUTO: 8.6 % (ref 3.8–10.2)
NEUTROPHILS # BLD: 72.2 % (ref 42.8–82.8)
PLATELET # BLD AUTO: 171 10^3/UL (ref 134–434)
PMV BLD: 10.8 FL (ref 7.5–11.1)
PROT SERPL-MCNC: 5.9 G/DL (ref 6.4–8.2)
RBC # BLD AUTO: 3.96 M/MM3 (ref 4–5.6)
SODIUM SERPL-SCNC: 137 MMOL/L (ref 136–145)
WBC # BLD AUTO: 9.4 K/MM3 (ref 4–10)

## 2023-02-11 RX ADMIN — HEPARIN SODIUM SCH UNIT: 5000 INJECTION, SOLUTION INTRAVENOUS; SUBCUTANEOUS at 01:55

## 2023-02-11 RX ADMIN — PREGABALIN SCH MG: 50 CAPSULE ORAL at 05:18

## 2023-02-11 RX ADMIN — FOLIC ACID SCH MG: 1 TABLET ORAL at 10:01

## 2023-02-11 RX ADMIN — HEPARIN SODIUM SCH UNIT: 5000 INJECTION, SOLUTION INTRAVENOUS; SUBCUTANEOUS at 10:01

## 2023-02-11 RX ADMIN — LIDOCAINE SCH: 50 PATCH TOPICAL at 10:02

## 2023-02-11 RX ADMIN — CEFAZOLIN SCH MLS/HR: 1 INJECTION, POWDER, FOR SOLUTION INTRAVENOUS at 13:36

## 2023-02-11 RX ADMIN — CEFAZOLIN SCH MLS/HR: 1 INJECTION, POWDER, FOR SOLUTION INTRAVENOUS at 05:19

## 2023-02-11 RX ADMIN — Medication SCH: at 21:02

## 2023-02-11 RX ADMIN — DOCUSATE SODIUM SCH MG: 100 CAPSULE, LIQUID FILLED ORAL at 13:36

## 2023-02-11 RX ADMIN — FERROUS SULFATE TAB EC 324 MG (65 MG FE EQUIVALENT) SCH MG: 324 (65 FE) TABLET DELAYED RESPONSE at 10:01

## 2023-02-11 RX ADMIN — DOCUSATE SODIUM SCH MG: 100 CAPSULE, LIQUID FILLED ORAL at 21:02

## 2023-02-11 RX ADMIN — PREGABALIN SCH MG: 50 CAPSULE ORAL at 13:36

## 2023-02-11 RX ADMIN — BENZOCAINE AND MENTHOL PRN EACH: 15; 3.6 LOZENGE ORAL at 13:37

## 2023-02-11 RX ADMIN — POLYETHYLENE GLYCOL 3350 SCH GM: 17 POWDER, FOR SOLUTION ORAL at 09:59

## 2023-02-11 RX ADMIN — NICOTINE SCH MG: 7 PATCH TRANSDERMAL at 10:08

## 2023-02-11 RX ADMIN — HEPARIN SODIUM SCH UNIT: 5000 INJECTION, SOLUTION INTRAVENOUS; SUBCUTANEOUS at 17:51

## 2023-02-11 RX ADMIN — Medication PRN MG: at 21:02

## 2023-02-11 RX ADMIN — ACETAMINOPHEN SCH MG: 500 TABLET, FILM COATED ORAL at 22:35

## 2023-02-11 RX ADMIN — ACETAMINOPHEN SCH MG: 500 TABLET, FILM COATED ORAL at 05:19

## 2023-02-11 RX ADMIN — PREGABALIN SCH MG: 50 CAPSULE ORAL at 21:02

## 2023-02-11 RX ADMIN — POLYETHYLENE GLYCOL 3350 SCH GM: 17 POWDER, FOR SOLUTION ORAL at 21:02

## 2023-02-11 RX ADMIN — CEFAZOLIN SCH MLS/HR: 1 INJECTION, POWDER, FOR SOLUTION INTRAVENOUS at 21:02

## 2023-02-11 RX ADMIN — ACETAMINOPHEN SCH MG: 500 TABLET, FILM COATED ORAL at 12:08

## 2023-02-11 RX ADMIN — ACETAMINOPHEN SCH MG: 500 TABLET, FILM COATED ORAL at 17:49

## 2023-02-11 RX ADMIN — DOCUSATE SODIUM SCH MG: 100 CAPSULE, LIQUID FILLED ORAL at 05:19

## 2023-02-12 LAB
ALBUMIN SERPL-MCNC: 3.1 G/DL (ref 3.4–5)
ALP SERPL-CCNC: 123 U/L (ref 45–117)
ALT SERPL-CCNC: 49 U/L (ref 13–61)
ANION GAP SERPL CALC-SCNC: 2 MMOL/L (ref 8–16)
AST SERPL-CCNC: 77 U/L (ref 15–37)
BASOPHILS # BLD: 0.9 % (ref 0–2)
BILIRUB SERPL-MCNC: 0.5 MG/DL (ref 0.2–1)
BUN SERPL-MCNC: 8.1 MG/DL (ref 7–18)
CALCIUM SERPL-MCNC: 8.8 MG/DL (ref 8.5–10.1)
CHLORIDE SERPL-SCNC: 104 MMOL/L (ref 98–107)
CO2 SERPL-SCNC: 33 MMOL/L (ref 21–32)
CREAT SERPL-MCNC: 0.8 MG/DL (ref 0.55–1.3)
DEPRECATED RDW RBC AUTO: 14.5 % (ref 11.9–15.9)
EOSINOPHIL # BLD: 1.3 % (ref 0–4.5)
GLUCOSE SERPL-MCNC: 102 MG/DL (ref 74–106)
HCT VFR BLD CALC: 34.6 % (ref 35.4–49)
HGB BLD-MCNC: 11.5 GM/DL (ref 11.7–16.9)
LYMPHOCYTES # BLD: 19.9 % (ref 8–40)
MAGNESIUM SERPL-MCNC: 2.1 MG/DL (ref 1.8–2.4)
MCH RBC QN AUTO: 29.1 PG (ref 25.7–33.7)
MCHC RBC AUTO-ENTMCNC: 33.3 G/DL (ref 32–35.9)
MCV RBC: 87.5 FL (ref 80–96)
MONOCYTES # BLD AUTO: 11.3 % (ref 3.8–10.2)
NEUTROPHILS # BLD: 66.6 % (ref 42.8–82.8)
PLATELET # BLD AUTO: 189 10^3/UL (ref 134–434)
PMV BLD: 10.5 FL (ref 7.5–11.1)
PROT SERPL-MCNC: 6.3 G/DL (ref 6.4–8.2)
RBC # BLD AUTO: 3.96 M/MM3 (ref 4–5.6)
SODIUM SERPL-SCNC: 139 MMOL/L (ref 136–145)
WBC # BLD AUTO: 8.1 K/MM3 (ref 4–10)

## 2023-02-12 RX ADMIN — FOLIC ACID SCH MG: 1 TABLET ORAL at 09:23

## 2023-02-12 RX ADMIN — CEFAZOLIN SCH MLS/HR: 1 INJECTION, POWDER, FOR SOLUTION INTRAVENOUS at 13:33

## 2023-02-12 RX ADMIN — POLYETHYLENE GLYCOL 3350 SCH GM: 17 POWDER, FOR SOLUTION ORAL at 09:25

## 2023-02-12 RX ADMIN — NICOTINE SCH MG: 7 PATCH TRANSDERMAL at 09:24

## 2023-02-12 RX ADMIN — DOCUSATE SODIUM SCH MG: 100 CAPSULE, LIQUID FILLED ORAL at 13:33

## 2023-02-12 RX ADMIN — GABAPENTIN SCH MG: 100 CAPSULE ORAL at 21:48

## 2023-02-12 RX ADMIN — PREGABALIN SCH MG: 50 CAPSULE ORAL at 13:33

## 2023-02-12 RX ADMIN — HEPARIN SODIUM SCH UNIT: 5000 INJECTION, SOLUTION INTRAVENOUS; SUBCUTANEOUS at 17:08

## 2023-02-12 RX ADMIN — LIDOCAINE SCH PATCH: 50 PATCH TOPICAL at 09:26

## 2023-02-12 RX ADMIN — CEFAZOLIN SCH MLS/HR: 1 INJECTION, POWDER, FOR SOLUTION INTRAVENOUS at 21:43

## 2023-02-12 RX ADMIN — GABAPENTIN SCH MG: 100 CAPSULE ORAL at 15:42

## 2023-02-12 RX ADMIN — ACETAMINOPHEN SCH: 500 TABLET, FILM COATED ORAL at 17:31

## 2023-02-12 RX ADMIN — PREGABALIN SCH MG: 50 CAPSULE ORAL at 21:48

## 2023-02-12 RX ADMIN — ACETAMINOPHEN SCH MG: 500 TABLET, FILM COATED ORAL at 05:44

## 2023-02-12 RX ADMIN — Medication SCH EACH: at 21:48

## 2023-02-12 RX ADMIN — DOCUSATE SODIUM SCH MG: 100 CAPSULE, LIQUID FILLED ORAL at 05:45

## 2023-02-12 RX ADMIN — CEFAZOLIN SCH MLS/HR: 1 INJECTION, POWDER, FOR SOLUTION INTRAVENOUS at 06:09

## 2023-02-12 RX ADMIN — ACETAMINOPHEN SCH MG: 500 TABLET, FILM COATED ORAL at 23:05

## 2023-02-12 RX ADMIN — POLYETHYLENE GLYCOL 3350 SCH GM: 17 POWDER, FOR SOLUTION ORAL at 21:47

## 2023-02-12 RX ADMIN — HEPARIN SODIUM SCH UNIT: 5000 INJECTION, SOLUTION INTRAVENOUS; SUBCUTANEOUS at 01:16

## 2023-02-12 RX ADMIN — LIDOCAINE SCH: 50 PATCH TOPICAL at 09:33

## 2023-02-12 RX ADMIN — LIDOCAINE SCH PATCH: 50 PATCH TOPICAL at 15:43

## 2023-02-12 RX ADMIN — Medication PRN MG: at 21:47

## 2023-02-12 RX ADMIN — FERROUS SULFATE TAB EC 324 MG (65 MG FE EQUIVALENT) SCH MG: 324 (65 FE) TABLET DELAYED RESPONSE at 09:23

## 2023-02-12 RX ADMIN — HEPARIN SODIUM SCH UNIT: 5000 INJECTION, SOLUTION INTRAVENOUS; SUBCUTANEOUS at 09:25

## 2023-02-12 RX ADMIN — ACETAMINOPHEN SCH: 500 TABLET, FILM COATED ORAL at 11:36

## 2023-02-12 RX ADMIN — DOCUSATE SODIUM SCH MG: 100 CAPSULE, LIQUID FILLED ORAL at 21:47

## 2023-02-12 RX ADMIN — PREGABALIN SCH MG: 50 CAPSULE ORAL at 05:45

## 2023-02-13 VITALS — RESPIRATION RATE: 20 BRPM

## 2023-02-13 LAB
ALBUMIN SERPL-MCNC: 2.7 G/DL (ref 3.4–5)
ALP SERPL-CCNC: 119 U/L (ref 45–117)
ALT SERPL-CCNC: 42 U/L (ref 13–61)
ANION GAP SERPL CALC-SCNC: 7 MMOL/L (ref 8–16)
AST SERPL-CCNC: 50 U/L (ref 15–37)
BASOPHILS # BLD: 1.2 % (ref 0–2)
BILIRUB SERPL-MCNC: 0.4 MG/DL (ref 0.2–1)
BUN SERPL-MCNC: 8.3 MG/DL (ref 7–18)
CALCIUM SERPL-MCNC: 8.7 MG/DL (ref 8.5–10.1)
CHLORIDE SERPL-SCNC: 102 MMOL/L (ref 98–107)
CO2 SERPL-SCNC: 30 MMOL/L (ref 21–32)
CREAT SERPL-MCNC: 0.8 MG/DL (ref 0.55–1.3)
DEPRECATED RDW RBC AUTO: 14.3 % (ref 11.9–15.9)
EOSINOPHIL # BLD: 2.7 % (ref 0–4.5)
GLUCOSE SERPL-MCNC: 100 MG/DL (ref 74–106)
HCT VFR BLD CALC: 31.1 % (ref 35.4–49)
HGB BLD-MCNC: 10.5 GM/DL (ref 11.7–16.9)
LYMPHOCYTES # BLD: 26.5 % (ref 8–40)
MAGNESIUM SERPL-MCNC: 2.1 MG/DL (ref 1.8–2.4)
MCH RBC QN AUTO: 29.7 PG (ref 25.7–33.7)
MCHC RBC AUTO-ENTMCNC: 33.8 G/DL (ref 32–35.9)
MCV RBC: 87.9 FL (ref 80–96)
MONOCYTES # BLD AUTO: 14 % (ref 3.8–10.2)
NEUTROPHILS # BLD: 55.6 % (ref 42.8–82.8)
PLATELET # BLD AUTO: 203 10^3/UL (ref 134–434)
PMV BLD: 10.3 FL (ref 7.5–11.1)
PROT SERPL-MCNC: 5.7 G/DL (ref 6.4–8.2)
RBC # BLD AUTO: 3.54 M/MM3 (ref 4–5.6)
SODIUM SERPL-SCNC: 139 MMOL/L (ref 136–145)
WBC # BLD AUTO: 6.5 K/MM3 (ref 4–10)

## 2023-02-13 RX ADMIN — HEPARIN SODIUM SCH UNIT: 5000 INJECTION, SOLUTION INTRAVENOUS; SUBCUTANEOUS at 17:16

## 2023-02-13 RX ADMIN — HEPARIN SODIUM SCH UNIT: 5000 INJECTION, SOLUTION INTRAVENOUS; SUBCUTANEOUS at 02:21

## 2023-02-13 RX ADMIN — GABAPENTIN SCH MG: 100 CAPSULE ORAL at 06:20

## 2023-02-13 RX ADMIN — FERROUS SULFATE TAB EC 324 MG (65 MG FE EQUIVALENT) SCH MG: 324 (65 FE) TABLET DELAYED RESPONSE at 10:05

## 2023-02-13 RX ADMIN — HEPARIN SODIUM SCH UNIT: 5000 INJECTION, SOLUTION INTRAVENOUS; SUBCUTANEOUS at 10:05

## 2023-02-13 RX ADMIN — LIDOCAINE SCH PATCH: 50 PATCH TOPICAL at 10:05

## 2023-02-13 RX ADMIN — PREGABALIN SCH MG: 100 CAPSULE ORAL at 14:37

## 2023-02-13 RX ADMIN — DOCUSATE SODIUM SCH MG: 100 CAPSULE, LIQUID FILLED ORAL at 14:37

## 2023-02-13 RX ADMIN — ACETAMINOPHEN SCH MG: 500 TABLET, FILM COATED ORAL at 10:30

## 2023-02-13 RX ADMIN — CEFAZOLIN SCH MLS/HR: 1 INJECTION, POWDER, FOR SOLUTION INTRAVENOUS at 14:37

## 2023-02-13 RX ADMIN — DOCUSATE SODIUM SCH MG: 100 CAPSULE, LIQUID FILLED ORAL at 06:20

## 2023-02-13 RX ADMIN — PREGABALIN SCH MG: 100 CAPSULE ORAL at 23:09

## 2023-02-13 RX ADMIN — CEFAZOLIN SCH MLS/HR: 1 INJECTION, POWDER, FOR SOLUTION INTRAVENOUS at 06:22

## 2023-02-13 RX ADMIN — ACETAMINOPHEN SCH MG: 500 TABLET, FILM COATED ORAL at 17:16

## 2023-02-13 RX ADMIN — FOLIC ACID SCH MG: 1 TABLET ORAL at 10:05

## 2023-02-13 RX ADMIN — POLYETHYLENE GLYCOL 3350 SCH GM: 17 POWDER, FOR SOLUTION ORAL at 10:05

## 2023-02-13 RX ADMIN — DOCUSATE SODIUM SCH MG: 100 CAPSULE, LIQUID FILLED ORAL at 23:10

## 2023-02-13 RX ADMIN — CEFAZOLIN SCH MLS/HR: 1 INJECTION, POWDER, FOR SOLUTION INTRAVENOUS at 23:08

## 2023-02-13 RX ADMIN — ACETAMINOPHEN SCH MG: 500 TABLET, FILM COATED ORAL at 06:21

## 2023-02-13 RX ADMIN — PREGABALIN SCH MG: 50 CAPSULE ORAL at 06:20

## 2023-02-13 RX ADMIN — NICOTINE SCH MG: 7 PATCH TRANSDERMAL at 10:05

## 2023-02-13 RX ADMIN — POLYETHYLENE GLYCOL 3350 SCH GM: 17 POWDER, FOR SOLUTION ORAL at 23:09

## 2023-02-14 VITALS — SYSTOLIC BLOOD PRESSURE: 122 MMHG | TEMPERATURE: 98.6 F | DIASTOLIC BLOOD PRESSURE: 77 MMHG | HEART RATE: 85 BPM

## 2023-02-14 LAB
ALBUMIN SERPL-MCNC: 2.8 G/DL (ref 3.4–5)
ALP SERPL-CCNC: 135 U/L (ref 45–117)
ALT SERPL-CCNC: 43 U/L (ref 13–61)
ANION GAP SERPL CALC-SCNC: 3 MMOL/L (ref 8–16)
AST SERPL-CCNC: 46 U/L (ref 15–37)
BASOPHILS # BLD: 1.2 % (ref 0–2)
BILIRUB SERPL-MCNC: 0.3 MG/DL (ref 0.2–1)
BUN SERPL-MCNC: 11.4 MG/DL (ref 7–18)
CALCIUM SERPL-MCNC: 9.1 MG/DL (ref 8.5–10.1)
CHLORIDE SERPL-SCNC: 102 MMOL/L (ref 98–107)
CO2 SERPL-SCNC: 33 MMOL/L (ref 21–32)
CREAT SERPL-MCNC: 0.8 MG/DL (ref 0.55–1.3)
DEPRECATED RDW RBC AUTO: 14.2 % (ref 11.9–15.9)
EOSINOPHIL # BLD: 2.8 % (ref 0–4.5)
GLUCOSE SERPL-MCNC: 97 MG/DL (ref 74–106)
HCT VFR BLD CALC: 31 % (ref 35.4–49)
HGB BLD-MCNC: 10.6 GM/DL (ref 11.7–16.9)
LYMPHOCYTES # BLD: 21.9 % (ref 8–40)
MAGNESIUM SERPL-MCNC: 2 MG/DL (ref 1.8–2.4)
MCH RBC QN AUTO: 29.9 PG (ref 25.7–33.7)
MCHC RBC AUTO-ENTMCNC: 34.2 G/DL (ref 32–35.9)
MCV RBC: 87.6 FL (ref 80–96)
MONOCYTES # BLD AUTO: 13.6 % (ref 3.8–10.2)
NEUTROPHILS # BLD: 60.5 % (ref 42.8–82.8)
PLATELET # BLD AUTO: 231 10^3/UL (ref 134–434)
PMV BLD: 10 FL (ref 7.5–11.1)
PROT SERPL-MCNC: 5.9 G/DL (ref 6.4–8.2)
RBC # BLD AUTO: 3.54 M/MM3 (ref 4–5.6)
SODIUM SERPL-SCNC: 137 MMOL/L (ref 136–145)
WBC # BLD AUTO: 7.4 K/MM3 (ref 4–10)

## 2023-02-14 RX ADMIN — DOCUSATE SODIUM SCH MG: 100 CAPSULE, LIQUID FILLED ORAL at 07:08

## 2023-02-14 RX ADMIN — HEPARIN SODIUM SCH UNIT: 5000 INJECTION, SOLUTION INTRAVENOUS; SUBCUTANEOUS at 01:48

## 2023-02-14 RX ADMIN — ACETAMINOPHEN SCH MG: 500 TABLET, FILM COATED ORAL at 07:08

## 2023-02-14 RX ADMIN — ACETAMINOPHEN SCH MG: 500 TABLET, FILM COATED ORAL at 00:47

## 2023-02-14 RX ADMIN — PREGABALIN SCH MG: 100 CAPSULE ORAL at 14:08

## 2023-02-14 RX ADMIN — DOCUSATE SODIUM SCH MG: 100 CAPSULE, LIQUID FILLED ORAL at 14:10

## 2023-02-14 RX ADMIN — HEPARIN SODIUM SCH UNIT: 5000 INJECTION, SOLUTION INTRAVENOUS; SUBCUTANEOUS at 09:36

## 2023-02-14 RX ADMIN — ACETAMINOPHEN SCH MG: 500 TABLET, FILM COATED ORAL at 12:13

## 2023-02-14 RX ADMIN — PREGABALIN SCH MG: 100 CAPSULE ORAL at 07:08

## 2023-02-14 RX ADMIN — CEFAZOLIN SCH MLS/HR: 1 INJECTION, POWDER, FOR SOLUTION INTRAVENOUS at 07:07

## 2023-02-14 RX ADMIN — POLYETHYLENE GLYCOL 3350 SCH GM: 17 POWDER, FOR SOLUTION ORAL at 09:36
